# Patient Record
Sex: FEMALE | Race: BLACK OR AFRICAN AMERICAN
[De-identification: names, ages, dates, MRNs, and addresses within clinical notes are randomized per-mention and may not be internally consistent; named-entity substitution may affect disease eponyms.]

---

## 2019-12-23 ENCOUNTER — HOSPITAL ENCOUNTER (EMERGENCY)
Dept: HOSPITAL 35 - ER | Age: 24
Discharge: HOME | End: 2019-12-23
Payer: COMMERCIAL

## 2019-12-23 VITALS — DIASTOLIC BLOOD PRESSURE: 73 MMHG | SYSTOLIC BLOOD PRESSURE: 124 MMHG

## 2019-12-23 VITALS — BODY MASS INDEX: 21.48 KG/M2 | HEIGHT: 69 IN | WEIGHT: 145 LBS

## 2019-12-23 DIAGNOSIS — R07.89: Primary | ICD-10-CM

## 2019-12-27 NOTE — EKG
[place] [address] [facility] [employer]
[place]  [address]
Phone:  [phone]                    ELECTROCARDIOGRAM REPORT      
_______________________________________________________________________________
 
Name:       RAUL HAYDEN                Room #:                     [facility] MANOJ YEPEZ#:      [id number]     Account #:      [id number]  
Admission:  19    Attend Phys:                          
Discharge:  19    Date of Birth:  [birth year]  
                                                          Report #: [id number]
   [id number]
_______________________________________________________________________________
THIS REPORT FOR:   //name//                          
 
                         [facility] ED
                                       
Test Date:    2019               Test Time:    21:30:25
Pat Name:     RAUL HAYDEN           Department:   
Patient ID:   [id number]            Room:          
Gender:       F                        Technician:   
:          1995               Requested By: Winston Richards
Order Number: [id number] MD:   Remigio Maldonado
                                 Measurements
Intervals                              Axis          
Rate:         74                       P:            76
SC:           157                      QRS:          67
QRSD:         84                       T:            56
QT:           393                                    
QTc:          436                                    
                           Interpretive Statements
Sinus rhythm
Borderline Q waves in lateral leads
No previous ECG available for comparison
 
Electronically Signed On 2019 14:51:09 CST by Remigio Maldonado
[email or web address]
 
 
 
 
 
 
 
 
 
 
 
 
 
 
 
 
 
 
 
  <ELECTRONICALLY SIGNED>
   By: Remigio Maldonado MD        
  19     1451
D: 19 213                           _____________________________________
T: 190                           Remigio Maldonado MD          /BARBARA

## 2022-08-25 ENCOUNTER — OFFICE VISIT (OUTPATIENT)
Dept: URBAN - METROPOLITAN AREA CLINIC 118 | Facility: CLINIC | Age: 27
End: 2022-08-25

## 2022-09-26 ENCOUNTER — DASHBOARD ENCOUNTERS (OUTPATIENT)
Age: 27
End: 2022-09-26

## 2022-10-13 ENCOUNTER — OFFICE VISIT (OUTPATIENT)
Dept: URBAN - METROPOLITAN AREA CLINIC 98 | Facility: CLINIC | Age: 27
End: 2022-10-13